# Patient Record
Sex: FEMALE | Race: WHITE | NOT HISPANIC OR LATINO | Employment: OTHER | URBAN - NONMETROPOLITAN AREA
[De-identification: names, ages, dates, MRNs, and addresses within clinical notes are randomized per-mention and may not be internally consistent; named-entity substitution may affect disease eponyms.]

---

## 2021-08-17 ENCOUNTER — OFFICE VISIT (OUTPATIENT)
Dept: FAMILY MEDICINE | Facility: CLINIC | Age: 71
End: 2021-08-17
Payer: MEDICARE

## 2021-08-17 VITALS
DIASTOLIC BLOOD PRESSURE: 74 MMHG | HEART RATE: 88 BPM | RESPIRATION RATE: 16 BRPM | TEMPERATURE: 98 F | BODY MASS INDEX: 23.57 KG/M2 | WEIGHT: 133 LBS | HEIGHT: 63 IN | SYSTOLIC BLOOD PRESSURE: 122 MMHG | OXYGEN SATURATION: 98 %

## 2021-08-17 DIAGNOSIS — Z20.822 EXPOSURE TO COVID-19 VIRUS: Primary | ICD-10-CM

## 2021-08-17 LAB
CTP QC/QA: YES
SARS-COV-2 AG RESP QL IA.RAPID: NEGATIVE

## 2021-08-17 PROCEDURE — 99202 PR OFFICE/OUTPT VISIT, NEW, LEVL II, 15-29 MIN: ICD-10-PCS | Mod: ,,, | Performed by: FAMILY MEDICINE

## 2021-08-17 PROCEDURE — 87426 SARSCOV CORONAVIRUS AG IA: CPT | Mod: RHCUB,CR | Performed by: FAMILY MEDICINE

## 2021-08-17 PROCEDURE — 99202 OFFICE O/P NEW SF 15 MIN: CPT | Mod: ,,, | Performed by: FAMILY MEDICINE

## 2021-08-17 RX ORDER — LANOLIN ALCOHOL/MO/W.PET/CERES
1000 CREAM (GRAM) TOPICAL
COMMUNITY
End: 2022-07-20

## 2021-08-17 RX ORDER — ASCORBIC ACID 500 MG
500 TABLET ORAL
COMMUNITY
End: 2022-07-20

## 2021-08-17 RX ORDER — FLECAINIDE ACETATE 50 MG/1
1 TABLET ORAL 2 TIMES DAILY
COMMUNITY
Start: 2021-05-24 | End: 2022-07-20

## 2021-08-17 RX ORDER — METFORMIN HYDROCHLORIDE 500 MG/1
500 TABLET ORAL 2 TIMES DAILY WITH MEALS
COMMUNITY

## 2021-08-17 RX ORDER — CHOLECALCIFEROL (VITAMIN D3) 25 MCG
1000 TABLET ORAL
COMMUNITY

## 2021-08-17 RX ORDER — ATORVASTATIN CALCIUM 10 MG/1
TABLET, FILM COATED ORAL
COMMUNITY
End: 2022-07-20

## 2021-08-17 RX ORDER — ALPRAZOLAM 0.25 MG/1
TABLET ORAL
COMMUNITY
Start: 2021-06-17 | End: 2022-07-20

## 2021-08-18 PROBLEM — Z20.822 EXPOSURE TO COVID-19 VIRUS: Status: ACTIVE | Noted: 2021-08-18

## 2022-03-11 DIAGNOSIS — Z71.89 COMPLEX CARE COORDINATION: ICD-10-CM

## 2022-06-15 ENCOUNTER — OFFICE VISIT (OUTPATIENT)
Dept: PRIMARY CARE CLINIC | Facility: CLINIC | Age: 72
End: 2022-06-15
Payer: MEDICARE

## 2022-06-15 VITALS
OXYGEN SATURATION: 98 % | SYSTOLIC BLOOD PRESSURE: 122 MMHG | DIASTOLIC BLOOD PRESSURE: 71 MMHG | BODY MASS INDEX: 24.63 KG/M2 | HEART RATE: 73 BPM | HEIGHT: 63 IN | TEMPERATURE: 98 F | RESPIRATION RATE: 18 BRPM | WEIGHT: 139 LBS

## 2022-06-15 DIAGNOSIS — E78.5 HYPERLIPIDEMIA, UNSPECIFIED HYPERLIPIDEMIA TYPE: Chronic | ICD-10-CM

## 2022-06-15 DIAGNOSIS — J30.2 SEASONAL ALLERGIES: Chronic | ICD-10-CM

## 2022-06-15 DIAGNOSIS — E11.9 TYPE 2 DIABETES MELLITUS WITHOUT COMPLICATION, WITHOUT LONG-TERM CURRENT USE OF INSULIN: Chronic | ICD-10-CM

## 2022-06-15 DIAGNOSIS — L30.9 DERMATITIS: Primary | ICD-10-CM

## 2022-06-15 DIAGNOSIS — F41.1 GENERALIZED ANXIETY DISORDER: Chronic | ICD-10-CM

## 2022-06-15 PROCEDURE — 99214 PR OFFICE/OUTPT VISIT, EST, LEVL IV, 30-39 MIN: ICD-10-PCS | Mod: ,,, | Performed by: NURSE PRACTITIONER

## 2022-06-15 PROCEDURE — 99214 OFFICE O/P EST MOD 30 MIN: CPT | Mod: ,,, | Performed by: NURSE PRACTITIONER

## 2022-06-15 RX ORDER — EZETIMIBE 10 MG/1
10 TABLET ORAL DAILY
COMMUNITY
Start: 2022-06-08 | End: 2022-07-20

## 2022-06-15 RX ORDER — TRIAMCINOLONE ACETONIDE 0.25 MG/G
CREAM TOPICAL 2 TIMES DAILY
Qty: 15 G | Refills: 0 | Status: SHIPPED | OUTPATIENT
Start: 2022-06-15 | End: 2022-07-20

## 2022-06-15 RX ORDER — BLOOD-GLUCOSE METER
KIT MISCELLANEOUS
COMMUNITY
Start: 2022-05-26 | End: 2022-07-20

## 2022-06-15 RX ORDER — PHENIRAMINE MALEATE, PHENYLEPHRINE HCL 17.5; 1 MG/1; MG/1
1 TABLET ORAL
COMMUNITY
Start: 2022-06-03 | End: 2022-07-20

## 2022-06-15 RX ORDER — LANCETS 28 GAUGE
EACH MISCELLANEOUS DAILY
COMMUNITY
Start: 2022-05-26 | End: 2022-07-20

## 2022-06-15 RX ORDER — GLIMEPIRIDE 1 MG/1
1 TABLET ORAL DAILY
COMMUNITY
Start: 2022-06-08 | End: 2023-07-26

## 2022-06-15 RX ORDER — ROSUVASTATIN CALCIUM 20 MG/1
20 TABLET, COATED ORAL NIGHTLY
COMMUNITY
Start: 2022-05-16

## 2022-06-15 NOTE — PROGRESS NOTES
"  NEW CLINIC NOTE    Kaykay Coronel is a 72 y.o. White female     Chief Complaint   Patient presents with    Herpes Zoster        SUBJECTIVE  Pt presents to clinic today with concerns of shingles to B shoulders. Reports she woke from sleep one night with a few lesions on B shoulders. Reports areas are not painful, no itching, or irritating. Areas presented about two weeks ago after spending the night with her brother and further stating he does "not have a clean house". She has picked tops of wounds off now causing red open areas. No drainage noted form areas but reports while she was picking tops off, areas would bleed. She has been applying polysporin and alcohol topically for areas with no improvement.      Current Outpatient Medications on File Prior to Visit   Medication Sig Dispense Refill    blood sugar diagnostic Strp as directed      metFORMIN (GLUCOPHAGE) 500 MG tablet Take 500 mg by mouth 2 (two) times daily with meals.      ALAHIST D 10-17.5 mg Tab Take 1 tablet by mouth.      ALPRAZolam (XANAX) 0.25 MG tablet TAKE 1 TABLET BY MOUTH EVERY DAY. MAY TAKE 1/2 OR 1      ascorbic acid, vitamin C, (VITAMIN C) 500 MG tablet Take 500 mg by mouth.      atorvastatin (LIPITOR) 10 MG tablet Lipitor Oral tablet 10 mg take 1 tablet (10 mg) by oral route once daily at bedtime   Suspended      cyanocobalamin (VITAMIN B-12) 1000 MCG tablet Take 1,000 mcg by mouth.      desloratadine (CLARINEX ORAL) Clarinex Take 1 tablet (oral) 1 time per day for 7 days [id number] tablet,disintegrating 1 time per day oral 7 days suspended 5 mg      docosahexaenoic acid/epa (FISH OIL ORAL)       ezetimibe (ZETIA) 10 mg tablet Take 10 mg by mouth once daily.      flecainide (TAMBOCOR) 50 MG Tab Take 1 tablet by mouth 2 (two) times daily.      FREESTYLE LANCETS 28 gauge lancets once daily.      FREESTYLE LITE METER kit USE TO CHECK BLOOD SUGAR ONCE EVERY DAY.      glimepiride (AMARYL) 1 MG tablet Take 1 mg by mouth once daily.   " "   rosuvastatin (CRESTOR) 20 MG tablet Take 20 mg by mouth nightly.      vitamin B complex 100  2-herbs 100 mg Tab as directed      vitamin D (VITAMIN D3) 1000 units Tab Take 1,000 Units by mouth.       No current facility-administered medications on file prior to visit.      Review of patient's allergies indicates:   Allergen Reactions    Erythromycin base     Rye grass     Sulfamethoxazole-trimethoprim Other (See Comments)        Past Medical History:   Diagnosis Date    Diabetes mellitus, type 2     Hyperlipidemia       Past Surgical History:   Procedure Laterality Date    CARDIAC SURGERY       Family History   Problem Relation Age of Onset    Anemia Brother      Social History     Socioeconomic History    Marital status:    Tobacco Use    Smoking status: Never Smoker    Smokeless tobacco: Never Used   Substance and Sexual Activity    Alcohol use: Yes     Alcohol/week: 0.0 standard drinks     Comment: rare     Drug use: Never    Sexual activity: Yes     Partners: Male        No results found for: WBC, HGB, HCT, MCV, PLT     CMP  No results found for: NA, K, CL, CO2, GLU, BUN, CREATININE, CALCIUM, PROT, ALBUMIN, BILITOT, ALKPHOS, AST, ALT, ANIONGAP, ESTGFRAFRICA, EGFRNONAA  No results found for: LABA1C, HGBA1C      OBJECTIVE  /71 (BP Location: Left arm, Patient Position: Sitting, BP Method: Medium (Automatic))   Pulse 73   Temp 98.1 °F (36.7 °C) (Oral)   Resp 18   Ht 5' 3" (1.6 m)   Wt 63 kg (139 lb)   SpO2 98%   BMI 24.62 kg/m²      Physical Exam  Vitals and nursing note reviewed.   Constitutional:       Appearance: Normal appearance. She is normal weight.   HENT:      Mouth/Throat:      Mouth: Mucous membranes are moist.   Cardiovascular:      Rate and Rhythm: Normal rate and regular rhythm.      Pulses: Normal pulses.      Heart sounds: Normal heart sounds.   Pulmonary:      Effort: Pulmonary effort is normal.      Breath sounds: Normal breath sounds.   Musculoskeletal:       "   General: Normal range of motion.      Cervical back: Normal range of motion and neck supple.   Lymphadenopathy:      Cervical: No cervical adenopathy.   Skin:     Capillary Refill: Capillary refill takes less than 2 seconds.      Findings: Rash and wound present. Rash is crusting and papular.             Comments: Slight erythema to circular lesion to B upper shoulders. Few areas are open with excoriation from scratching.   Neurological:      Mental Status: She is alert. Mental status is at baseline.   Psychiatric:         Behavior: Behavior normal.            ASSESSMENT & PLAN  1. Dermatitis    2. Type 2 diabetes mellitus without complication, without long-term current use of insulin    3. Hyperlipidemia, unspecified hyperlipidemia type    4. Generalized anxiety disorder    5. Seasonal allergies         Problem List Items Addressed This Visit    None     Visit Diagnoses     Dermatitis    -  Primary    topical therapy  educated pt on presentation of shingles and how today's complaint is not consistent with shingles    Relevant Medications    triamcinolone acetonide 0.025% (KENALOG) 0.025 % cream    Type 2 diabetes mellitus without complication, without long-term current use of insulin  (Chronic)       Relevant Medications    glimepiride (AMARYL) 1 MG tablet    Hyperlipidemia, unspecified hyperlipidemia type  (Chronic)       Generalized anxiety disorder  (Chronic)       Seasonal allergies  (Chronic)             Follow up in about 1 week (around 6/22/2022) for if no better.